# Patient Record
Sex: FEMALE | Race: WHITE | URBAN - NONMETROPOLITAN AREA
[De-identification: names, ages, dates, MRNs, and addresses within clinical notes are randomized per-mention and may not be internally consistent; named-entity substitution may affect disease eponyms.]

---

## 2021-10-07 ENCOUNTER — WEB ENCOUNTER (OUTPATIENT)
Dept: URBAN - NONMETROPOLITAN AREA CLINIC 13 | Facility: CLINIC | Age: 66
End: 2021-10-07

## 2021-10-07 ENCOUNTER — LAB OUTSIDE AN ENCOUNTER (OUTPATIENT)
Dept: URBAN - NONMETROPOLITAN AREA CLINIC 13 | Facility: CLINIC | Age: 66
End: 2021-10-07

## 2021-10-07 ENCOUNTER — OFFICE VISIT (OUTPATIENT)
Dept: URBAN - NONMETROPOLITAN AREA CLINIC 13 | Facility: CLINIC | Age: 66
End: 2021-10-07
Payer: MEDICARE

## 2021-10-07 DIAGNOSIS — R13.19 ESOPHAGEAL DYSPHAGIA: ICD-10-CM

## 2021-10-07 DIAGNOSIS — K62.5 RECTAL BLEEDING: ICD-10-CM

## 2021-10-07 DIAGNOSIS — K58.2 IRRITABLE BOWEL SYNDROME WITH BOTH CONSTIPATION AND DIARRHEA: ICD-10-CM

## 2021-10-07 DIAGNOSIS — R10.12 LEFT UPPER QUADRANT PAIN: ICD-10-CM

## 2021-10-07 DIAGNOSIS — Z12.11 COLON CANCER SCREENING: ICD-10-CM

## 2021-10-07 PROCEDURE — 99204 OFFICE O/P NEW MOD 45 MIN: CPT | Performed by: NURSE PRACTITIONER

## 2021-10-07 RX ORDER — POLYETHYLENE GLYCOL 3350, SODIUM SULFATE, SODIUM CHLORIDE, POTASSIUM CHLORIDE, ASCORBIC ACID, SODIUM ASCORBATE 140-9-5.2G
AS DIRECTED KIT ORAL ONCE
Qty: 1 KIT | Refills: 0 | OUTPATIENT
Start: 2021-10-07 | End: 2021-10-08

## 2021-10-07 RX ORDER — AMLODIPINE BESYLATE AND BENAZEPRIL HYDROCHLORIDE 5; 10 MG/1; MG/1
AS DIRECTED CAPSULE ORAL
Status: ACTIVE | COMMUNITY

## 2021-10-07 RX ORDER — DICYCLOMINE HYDROCHLORIDE 10 MG/1
1 CAPSULE BEFORE MEALS PRN ABDOMINAL PAIN CAPSULE ORAL THREE TIMES A DAY
Qty: 90 CAPSULE | Refills: 3 | OUTPATIENT
Start: 2021-10-07 | End: 2022-02-03

## 2021-10-07 RX ORDER — BUSPIRONE HYDROCHLORIDE 5 MG/1
1 TABLET TABLET ORAL TWICE A DAY
Status: ACTIVE | COMMUNITY

## 2021-10-07 RX ORDER — PANTOPRAZOLE SODIUM 40 MG/1
1 TABLET TABLET, DELAYED RELEASE ORAL ONCE A DAY
Qty: 90 TABLET | Refills: 3 | OUTPATIENT
Start: 2021-10-07

## 2021-10-07 NOTE — HPI-TODAY'S VISIT:
10/7/21 Megan Man is a 67 YO F who presents with complaints of rectal bleeding. She also has changes in her bowel habits with constipation and alternating urgency for bowel movementand recently has had abdominal pain, more so in the left upper quadrant, bloating, gas. She reports dysphagia for solids. Denies heartburn and indigestion.  Her last colonoscopy was 15 years ago with Dr. VILLASEÑOR.  Her bowel habits seem to be better on metamucil 2 capsules daily. She has been on bentyl in the past for IBS and is requesting refill today. TG

## 2021-10-11 ENCOUNTER — TELEPHONE ENCOUNTER (OUTPATIENT)
Dept: URBAN - NONMETROPOLITAN AREA CLINIC 2 | Facility: CLINIC | Age: 66
End: 2021-10-11

## 2021-11-10 ENCOUNTER — CLAIMS CREATED FROM THE CLAIM WINDOW (OUTPATIENT)
Dept: URBAN - METROPOLITAN AREA CLINIC 4 | Facility: CLINIC | Age: 66
End: 2021-11-10
Payer: MEDICARE

## 2021-11-10 ENCOUNTER — OFFICE VISIT (OUTPATIENT)
Dept: URBAN - NONMETROPOLITAN AREA SURGERY CENTER 1 | Facility: SURGERY CENTER | Age: 66
End: 2021-11-10
Payer: MEDICARE

## 2021-11-10 DIAGNOSIS — K31.89 DEFORMED PYLORUS, ACQUIRED: ICD-10-CM

## 2021-11-10 DIAGNOSIS — K63.89 BACTERIAL OVERGROWTH SYNDROME: ICD-10-CM

## 2021-11-10 DIAGNOSIS — K90.0 CELIAC DISEASE: ICD-10-CM

## 2021-11-10 DIAGNOSIS — D12.3 ADENOMA OF TRANSVERSE COLON: ICD-10-CM

## 2021-11-10 DIAGNOSIS — K31.89 ACQUIRED DEFORMITY OF DUODENUM: ICD-10-CM

## 2021-11-10 DIAGNOSIS — K63.89 POLYP, HYPERPLASTIC: ICD-10-CM

## 2021-11-10 DIAGNOSIS — K21.9 ACID REFLUX: ICD-10-CM

## 2021-11-10 DIAGNOSIS — D12.3 BENIGN NEOPLASM OF TRANSVERSE COLON: ICD-10-CM

## 2021-11-10 DIAGNOSIS — K92.1 HEMATOCHEZIA: ICD-10-CM

## 2021-11-10 DIAGNOSIS — K21.9 GASTRO-ESOPHAGEAL REFLUX DISEASE WITHOUT ESOPHAGITIS: ICD-10-CM

## 2021-11-10 DIAGNOSIS — R13.19 DYSPHAGIA: ICD-10-CM

## 2021-11-10 PROCEDURE — 88305 TISSUE EXAM BY PATHOLOGIST: CPT | Performed by: PATHOLOGY

## 2021-11-10 PROCEDURE — 43239 EGD BIOPSY SINGLE/MULTIPLE: CPT | Performed by: INTERNAL MEDICINE

## 2021-11-10 PROCEDURE — G8907 PT DOC NO EVENTS ON DISCHARG: HCPCS | Performed by: INTERNAL MEDICINE

## 2021-11-10 PROCEDURE — 88342 IMHCHEM/IMCYTCHM 1ST ANTB: CPT | Performed by: PATHOLOGY

## 2021-11-10 PROCEDURE — 45385 COLONOSCOPY W/LESION REMOVAL: CPT | Performed by: INTERNAL MEDICINE

## 2021-11-10 PROCEDURE — 45380 COLONOSCOPY AND BIOPSY: CPT | Performed by: INTERNAL MEDICINE

## 2021-11-10 PROCEDURE — 88312 SPECIAL STAINS GROUP 1: CPT | Performed by: PATHOLOGY

## 2021-11-10 RX ORDER — PANTOPRAZOLE SODIUM 40 MG/1
1 TABLET TABLET, DELAYED RELEASE ORAL ONCE A DAY
Qty: 90 TABLET | Refills: 3 | Status: ACTIVE | COMMUNITY
Start: 2021-10-07

## 2021-11-10 RX ORDER — AMLODIPINE BESYLATE AND BENAZEPRIL HYDROCHLORIDE 5; 10 MG/1; MG/1
AS DIRECTED CAPSULE ORAL
Status: ACTIVE | COMMUNITY

## 2021-11-10 RX ORDER — DICYCLOMINE HYDROCHLORIDE 10 MG/1
1 CAPSULE BEFORE MEALS PRN ABDOMINAL PAIN CAPSULE ORAL THREE TIMES A DAY
Qty: 90 CAPSULE | Refills: 3 | Status: ACTIVE | COMMUNITY
Start: 2021-10-07 | End: 2022-02-03

## 2021-11-10 RX ORDER — BUSPIRONE HYDROCHLORIDE 5 MG/1
1 TABLET TABLET ORAL TWICE A DAY
Status: ACTIVE | COMMUNITY

## 2021-11-24 ENCOUNTER — OFFICE VISIT (OUTPATIENT)
Dept: URBAN - NONMETROPOLITAN AREA CLINIC 2 | Facility: CLINIC | Age: 66
End: 2021-11-24

## 2021-12-02 ENCOUNTER — TELEPHONE ENCOUNTER (OUTPATIENT)
Dept: URBAN - NONMETROPOLITAN AREA CLINIC 2 | Facility: CLINIC | Age: 66
End: 2021-12-02

## 2021-12-08 ENCOUNTER — OFFICE VISIT (OUTPATIENT)
Dept: URBAN - NONMETROPOLITAN AREA SURGERY CENTER 1 | Facility: SURGERY CENTER | Age: 66
End: 2021-12-08

## 2021-12-08 ENCOUNTER — OFFICE VISIT (OUTPATIENT)
Dept: URBAN - NONMETROPOLITAN AREA CLINIC 13 | Facility: CLINIC | Age: 66
End: 2021-12-08
Payer: MEDICARE

## 2021-12-08 DIAGNOSIS — K90.0 CELIAC DISEASE: ICD-10-CM

## 2021-12-08 DIAGNOSIS — R10.12 LEFT UPPER QUADRANT PAIN: ICD-10-CM

## 2021-12-08 DIAGNOSIS — Z12.11 COLON CANCER SCREENING: ICD-10-CM

## 2021-12-08 DIAGNOSIS — K58.2 IRRITABLE BOWEL SYNDROME WITH BOTH CONSTIPATION AND DIARRHEA: ICD-10-CM

## 2021-12-08 DIAGNOSIS — R13.19 ESOPHAGEAL DYSPHAGIA: ICD-10-CM

## 2021-12-08 DIAGNOSIS — Z86.010 PERSONAL HISTORY OF COLONIC POLYPS: ICD-10-CM

## 2021-12-08 DIAGNOSIS — K21.00 GASTROESOPHAGEAL REFLUX DISEASE WITH ESOPHAGITIS WITHOUT HEMORRHAGE: ICD-10-CM

## 2021-12-08 PROCEDURE — 99214 OFFICE O/P EST MOD 30 MIN: CPT | Performed by: NURSE PRACTITIONER

## 2021-12-08 RX ORDER — DICYCLOMINE HYDROCHLORIDE 10 MG/1
1 CAPSULE BEFORE MEALS PRN ABDOMINAL PAIN CAPSULE ORAL THREE TIMES A DAY
Qty: 90 CAPSULE | Refills: 3 | Status: ACTIVE | COMMUNITY
Start: 2021-10-07 | End: 2022-02-03

## 2021-12-08 RX ORDER — AMLODIPINE BESYLATE AND BENAZEPRIL HYDROCHLORIDE 5; 10 MG/1; MG/1
AS DIRECTED CAPSULE ORAL
Status: ACTIVE | COMMUNITY

## 2021-12-08 RX ORDER — BUSPIRONE HYDROCHLORIDE 5 MG/1
1 TABLET TABLET ORAL TWICE A DAY
Status: ACTIVE | COMMUNITY

## 2021-12-08 RX ORDER — PANTOPRAZOLE SODIUM 40 MG/1
1 TABLET TABLET, DELAYED RELEASE ORAL ONCE A DAY
Qty: 90 TABLET | Refills: 3 | Status: ACTIVE | COMMUNITY
Start: 2021-10-07

## 2021-12-08 NOTE — HPI-TODAY'S VISIT:
10/7/21 Megan Man is a 65 YO F who presents with complaints of rectal bleeding. She also has changes in her bowel habits with constipation and alternating urgency for bowel movementand recently has had abdominal pain, more so in the left upper quadrant, bloating, gas. She reports dysphagia for solids. Denies heartburn and indigestion.  Her last colonoscopy was 15 years ago with Dr. VILLASEÑOR.  Her bowel habits seem to be better on metamucil 2 capsules daily. She has been on bentyl in the past for IBS and is requesting refill today. TG  12/8/2021 Ms Man presents for follow up after UGI, EGD, and colonoscopy.  UGI 11/5/21: tertiary contractions of esophagus r/t GERD and esophageal irritability, small medially directed 2nd portion of duodenum diverticulum.  EGD 11/10/2021 Dr. Lopez: normal exam. 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 Colonoscopy 11/10/2021; 5 polyps, largest measuring 15mm in the transverse colon removed piecemeal. Path c/w TA. Also noted to have left sided diverticulosis, and internal hemorrhoids. Repeat colonoscopy in 6 months with plenvu constipation prep per Dr. Lopez.

## 2021-12-16 ENCOUNTER — OFFICE VISIT (OUTPATIENT)
Dept: URBAN - NONMETROPOLITAN AREA CLINIC 13 | Facility: CLINIC | Age: 66
End: 2021-12-16

## 2021-12-20 LAB
A/G RATIO: 2.2
ADDITIONAL INFORMATION:: (no result)
ALBUMIN: 4.6
ALKALINE PHOSPHATASE: 83
ALT (SGPT): 26
AST (SGOT): 20
BILIRUBIN, TOTAL: 1.1
BUN/CREATININE RATIO: 29
BUN: 22
CALCIUM: 9.3
CARBON DIOXIDE, TOTAL: 24
CHLORIDE: 102
COMMENT:: (no result)
CREATININE: 0.76
DQ2 (DQA1 0501/0505,DQB1 02XX): POSITIVE
DQ8 (DQA1 03XX, DQB1 0302): NEGATIVE
EGFR IF AFRICN AM: 93
EGFR IF NONAFRICN AM: 81
ENDOMYSIAL ANTIBODY IGA: NEGATIVE
GLOBULIN, TOTAL: 2.1
GLUCOSE: 123
HEMATOCRIT: 42.8
HEMOGLOBIN: 14.5
IMMUNOGLOBULIN A, QN, SERUM: 185
MCH: 30
MCHC: 33.9
MCV: 89
NRBC: (no result)
PLATELETS: 263
POTASSIUM: 4.4
PROTEIN, TOTAL: 6.7
RBC: 4.83
RDW: 13.1
SODIUM: 139
T-TRANSGLUTAMINASE (TTG) IGA: <2
WBC: 6

## 2021-12-22 ENCOUNTER — TELEPHONE ENCOUNTER (OUTPATIENT)
Dept: URBAN - NONMETROPOLITAN AREA CLINIC 2 | Facility: CLINIC | Age: 66
End: 2021-12-22

## 2022-01-05 ENCOUNTER — OFFICE VISIT (OUTPATIENT)
Dept: URBAN - NONMETROPOLITAN AREA CLINIC 13 | Facility: CLINIC | Age: 67
End: 2022-01-05

## 2022-03-09 ENCOUNTER — OFFICE VISIT (OUTPATIENT)
Dept: URBAN - NONMETROPOLITAN AREA CLINIC 13 | Facility: CLINIC | Age: 67
End: 2022-03-09
Payer: MEDICARE

## 2022-03-09 ENCOUNTER — LAB OUTSIDE AN ENCOUNTER (OUTPATIENT)
Dept: URBAN - NONMETROPOLITAN AREA CLINIC 13 | Facility: CLINIC | Age: 67
End: 2022-03-09

## 2022-03-09 ENCOUNTER — DASHBOARD ENCOUNTERS (OUTPATIENT)
Age: 67
End: 2022-03-09

## 2022-03-09 DIAGNOSIS — R10.12 LEFT UPPER QUADRANT PAIN: ICD-10-CM

## 2022-03-09 DIAGNOSIS — K21.00 GASTROESOPHAGEAL REFLUX DISEASE WITH ESOPHAGITIS WITHOUT HEMORRHAGE: ICD-10-CM

## 2022-03-09 DIAGNOSIS — K90.0 CELIAC DISEASE: ICD-10-CM

## 2022-03-09 DIAGNOSIS — R13.19 ESOPHAGEAL DYSPHAGIA: ICD-10-CM

## 2022-03-09 DIAGNOSIS — R19.8 ALTERNATING CONSTIPATION AND DIARRHEA: ICD-10-CM

## 2022-03-09 DIAGNOSIS — Z86.010 PERSONAL HISTORY OF COLONIC POLYPS: ICD-10-CM

## 2022-03-09 PROBLEM — 10743008: Status: ACTIVE | Noted: 2021-10-07

## 2022-03-09 PROBLEM — 396331005: Status: ACTIVE | Noted: 2021-12-08

## 2022-03-09 PROBLEM — 266433003: Status: ACTIVE | Noted: 2021-12-08

## 2022-03-09 PROBLEM — 301715003: Status: ACTIVE | Noted: 2021-10-08

## 2022-03-09 PROCEDURE — 99214 OFFICE O/P EST MOD 30 MIN: CPT | Performed by: NURSE PRACTITIONER

## 2022-03-09 RX ORDER — PANTOPRAZOLE SODIUM 40 MG/1
1 TABLET TABLET, DELAYED RELEASE ORAL ONCE A DAY
Qty: 90 TABLET | Refills: 3 | Status: ACTIVE | COMMUNITY
Start: 2021-10-07

## 2022-03-09 RX ORDER — POLYETHYLENE GLYCOL 3350, SODIUM SULFATE, SODIUM CHLORIDE, POTASSIUM CHLORIDE, ASCORBIC ACID, SODIUM ASCORBATE 140-9-5.2G
AS DIRECTED KIT ORAL ONCE
Qty: 1 KIT | Refills: 0 | OUTPATIENT
Start: 2022-03-09 | End: 2022-03-10

## 2022-03-09 RX ORDER — AMLODIPINE BESYLATE AND BENAZEPRIL HYDROCHLORIDE 5; 10 MG/1; MG/1
AS DIRECTED CAPSULE ORAL
Status: ACTIVE | COMMUNITY

## 2022-03-09 RX ORDER — BUSPIRONE HYDROCHLORIDE 5 MG/1
1 TABLET TABLET ORAL TWICE A DAY
Status: ACTIVE | COMMUNITY

## 2022-03-09 NOTE — HPI-TODAY'S VISIT:
10/7/21 Megan Man is a 65 YO F who presents with complaints of rectal bleeding. She also has changes in her bowel habits with constipation and alternating urgency for bowel movementand recently has had abdominal pain, more so in the left upper quadrant, bloating, gas. She reports dysphagia for solids. Denies heartburn and indigestion.  Her last colonoscopy was 15 years ago with Dr. VILLASEÑOR.  Her bowel habits seem to be better on metamucil 2 capsules daily. She has been on bentyl in the past for IBS and is requesting refill today. TG  12/8/2021 Ms Man presents for follow up after UGI, EGD, and colonoscopy.  UGI 11/5/21: tertiary contractions of esophagus r/t GERD and esophageal irritability, small medially directed 2nd portion of duodenum diverticulum.  EGD 11/10/2021 Dr. Lopez: normal exam. 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 Colonoscopy 11/10/2021; 5 polyps, largest measuring 15mm in the transverse colon removed piecemeal. Path c/w TA. Also noted to have left sided diverticulosis, and internal hemorrhoids. Repeat colonoscopy in 6 months with plenvu constipation prep per Dr. Lopez.  3/9/22 Ms Man presents for follow up for for above GI symptoms, post serologic workup for celiac, and personal history of colon polyps.  Her celiac titers were negative. 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 She has been watching and limiting her gluten intake but has not gone on a gluten free diet. Her constipation, abdominal pain, bloating have resolved with her dietary changes. Discussed going on true GF diet and repeating EGD with [facility] to see if above changes resolved however she does not want to pursue any further workup at this time. She is happy with the improvement with limiting gluten and does not want to go on strict GF diet.  As above, she had 5 polyps on colonoscopy 11/2021, largest measurig 15mm and is due for repeat in May with plans to complete plenvu prep. Will get that scheduled today. TG

## 2022-05-13 PROBLEM — 428283002: Status: ACTIVE | Noted: 2021-12-08

## 2022-06-02 ENCOUNTER — OFFICE VISIT (OUTPATIENT)
Dept: URBAN - NONMETROPOLITAN AREA SURGERY CENTER 1 | Facility: SURGERY CENTER | Age: 67
End: 2022-06-02
Payer: MEDICARE

## 2022-06-02 ENCOUNTER — CLAIMS CREATED FROM THE CLAIM WINDOW (OUTPATIENT)
Dept: URBAN - METROPOLITAN AREA CLINIC 4 | Facility: CLINIC | Age: 67
End: 2022-06-02
Payer: MEDICARE

## 2022-06-02 DIAGNOSIS — K63.89 CYST OF DUODENUM: ICD-10-CM

## 2022-06-02 DIAGNOSIS — D12.2 ADENOMA OF ASCENDING COLON: ICD-10-CM

## 2022-06-02 DIAGNOSIS — K63.89 BACTERIAL OVERGROWTH SYNDROME: ICD-10-CM

## 2022-06-02 DIAGNOSIS — D12.2 BENIGN NEOPLASM OF ASCENDING COLON: ICD-10-CM

## 2022-06-02 DIAGNOSIS — Z86.010 ADENOMAS PERSONAL HISTORY OF COLONIC POLYPS: ICD-10-CM

## 2022-06-02 DIAGNOSIS — D12.4 ADENOMA OF DESCENDING COLON: ICD-10-CM

## 2022-06-02 DIAGNOSIS — D12.4 BENIGN NEOPLASM OF DESCENDING COLON: ICD-10-CM

## 2022-06-02 PROCEDURE — 88305 TISSUE EXAM BY PATHOLOGIST: CPT | Performed by: PATHOLOGY

## 2022-06-02 PROCEDURE — 45385 COLONOSCOPY W/LESION REMOVAL: CPT | Performed by: INTERNAL MEDICINE

## 2022-06-02 PROCEDURE — G8907 PT DOC NO EVENTS ON DISCHARG: HCPCS | Performed by: INTERNAL MEDICINE

## 2022-06-02 RX ORDER — BUSPIRONE HYDROCHLORIDE 5 MG/1
1 TABLET TABLET ORAL TWICE A DAY
Status: ACTIVE | COMMUNITY

## 2022-06-02 RX ORDER — PANTOPRAZOLE SODIUM 40 MG/1
1 TABLET TABLET, DELAYED RELEASE ORAL ONCE A DAY
Qty: 90 TABLET | Refills: 3 | Status: ACTIVE | COMMUNITY
Start: 2021-10-07

## 2022-06-02 RX ORDER — AMLODIPINE BESYLATE AND BENAZEPRIL HYDROCHLORIDE 5; 10 MG/1; MG/1
AS DIRECTED CAPSULE ORAL
Status: ACTIVE | COMMUNITY

## 2022-12-01 ENCOUNTER — OFFICE VISIT (OUTPATIENT)
Dept: URBAN - NONMETROPOLITAN AREA CLINIC 2 | Facility: CLINIC | Age: 67
End: 2022-12-01

## 2022-12-02 ENCOUNTER — OFFICE VISIT (OUTPATIENT)
Dept: URBAN - NONMETROPOLITAN AREA CLINIC 13 | Facility: CLINIC | Age: 67
End: 2022-12-02

## 2025-02-19 ENCOUNTER — LAB OUTSIDE AN ENCOUNTER (OUTPATIENT)
Dept: URBAN - NONMETROPOLITAN AREA CLINIC 2 | Facility: CLINIC | Age: 70
End: 2025-02-19

## 2025-02-19 ENCOUNTER — OFFICE VISIT (OUTPATIENT)
Dept: URBAN - NONMETROPOLITAN AREA CLINIC 2 | Facility: CLINIC | Age: 70
End: 2025-02-19

## 2025-02-19 VITALS
WEIGHT: 186 LBS | DIASTOLIC BLOOD PRESSURE: 90 MMHG | SYSTOLIC BLOOD PRESSURE: 151 MMHG | HEART RATE: 64 BPM | BODY MASS INDEX: 31.76 KG/M2 | HEIGHT: 64 IN

## 2025-02-19 RX ORDER — PANTOPRAZOLE SODIUM 40 MG/1
1 TABLET TABLET, DELAYED RELEASE ORAL ONCE A DAY
Qty: 90 TABLET | Refills: 3 | Status: DISCONTINUED | COMMUNITY
Start: 2021-10-07

## 2025-02-19 RX ORDER — BUSPIRONE HYDROCHLORIDE 5 MG/1
1 TABLET TABLET ORAL TWICE A DAY
Status: DISCONTINUED | COMMUNITY

## 2025-02-19 RX ORDER — AMLODIPINE BESYLATE AND BENAZEPRIL HYDROCHLORIDE 5; 10 MG/1; MG/1
AS DIRECTED CAPSULE ORAL
Status: ACTIVE | COMMUNITY

## 2025-02-20 LAB
ABSOLUTE BASOPHILS: 9
ABSOLUTE EOSINOPHILS: 511
ABSOLUTE LYMPHOCYTES: 1877
ABSOLUTE MONOCYTES: 419
ABSOLUTE NEUTROPHILS: 1785
ALBUMIN/GLOBULIN RATIO: 2
ALBUMIN: 4.7
ALKALINE PHOSPHATASE: 90
ALT: 20
AST: 18
BASOPHILS: 0.2
BILIRUBIN, TOTAL: 1.1
BUN/CREATININE RATIO: (no result)
CALCIUM: 9.9
CARBON DIOXIDE: 28
CHLORIDE: 102
CREATININE: 0.71
EGFR: 91
EOSINOPHILS: 11.1
FIB 4 INDEX: 1.05
FIB 4 INTERPRETATION: (no result)
GLOBULIN: 2.3
GLUCOSE: 111
HEMATOCRIT: 43.9
HEMOGLOBIN: 14.8
IMMUNOGLOBULIN A: 183
INTERPRETATION: (no result)
LYMPHOCYTES: 40.8
MCH: 30.3
MCHC: 33.7
MCV: 90
MONOCYTES: 9.1
MPV: 10.3
NEUTROPHILS: 38.8
PLATELET COUNT: 273
PLATELET COUNT: 273
POTASSIUM: 4.6
PROTEIN, TOTAL: 7
RDW: 12.8
RED BLOOD CELL COUNT: 4.88
SODIUM: 140
TISSUE TRANSGLUTAMINASE AB, IGA: <1
TSH W/REFLEX TO FT4: 0.71
UREA NITROGEN (BUN): 18
WHITE BLOOD CELL COUNT: 4.6

## 2025-02-21 ENCOUNTER — OFFICE VISIT (OUTPATIENT)
Dept: URBAN - NONMETROPOLITAN AREA CLINIC 2 | Facility: CLINIC | Age: 70
End: 2025-02-21

## 2025-02-26 ENCOUNTER — TELEPHONE ENCOUNTER (OUTPATIENT)
Dept: URBAN - NONMETROPOLITAN AREA CLINIC 2 | Facility: CLINIC | Age: 70
End: 2025-02-26

## 2025-03-06 ENCOUNTER — TELEPHONE ENCOUNTER (OUTPATIENT)
Dept: URBAN - METROPOLITAN AREA CLINIC 63 | Facility: CLINIC | Age: 70
End: 2025-03-06

## 2025-03-06 PROBLEM — 40890009: Status: ACTIVE | Noted: 2025-03-06

## 2025-03-06 PROBLEM — 116289008: Status: ACTIVE | Noted: 2025-03-06

## 2025-03-06 PROBLEM — 249517009: Status: ACTIVE | Noted: 2025-03-06

## 2025-03-06 PROBLEM — 54586004: Status: ACTIVE | Noted: 2025-03-06

## 2025-03-11 ENCOUNTER — TELEPHONE ENCOUNTER (OUTPATIENT)
Dept: URBAN - NONMETROPOLITAN AREA CLINIC 2 | Facility: CLINIC | Age: 70
End: 2025-03-11

## 2025-03-11 RX ORDER — SODIUM, POTASSIUM,MAG SULFATES 17.5-3.13G
177ML SOLUTION, RECONSTITUTED, ORAL ORAL
Qty: 1 | Refills: 0 | OUTPATIENT
Start: 2025-03-11 | End: 2025-03-13

## 2025-03-26 ENCOUNTER — OFFICE VISIT (OUTPATIENT)
Dept: URBAN - NONMETROPOLITAN AREA CLINIC 2 | Facility: CLINIC | Age: 70
End: 2025-03-26

## 2025-03-28 ENCOUNTER — TELEPHONE ENCOUNTER (OUTPATIENT)
Dept: URBAN - NONMETROPOLITAN AREA CLINIC 2 | Facility: CLINIC | Age: 70
End: 2025-03-28

## 2025-03-28 ENCOUNTER — OFFICE VISIT (OUTPATIENT)
Dept: URBAN - NONMETROPOLITAN AREA SURGERY CENTER 1 | Facility: SURGERY CENTER | Age: 70
End: 2025-03-28

## 2025-03-28 PROBLEM — 1086791000119100: Status: ACTIVE | Noted: 2025-03-28

## 2025-03-28 RX ORDER — AMLODIPINE BESYLATE AND BENAZEPRIL HYDROCHLORIDE 5; 10 MG/1; MG/1
AS DIRECTED CAPSULE ORAL
Status: ACTIVE | COMMUNITY

## 2025-03-28 RX ORDER — OMEPRAZOLE 40 MG/1
1 CAPSULE 30 MINUTES BEFORE MEAL CAPSULE, DELAYED RELEASE ORAL TWICE DAILY
Qty: 120 | Refills: 3 | OUTPATIENT
Start: 2025-03-28

## 2025-03-28 NOTE — HPI-TODAY'S VISIT:
Patient has active erosive gastritis noted on EGD.  Will send in omeprazole 40 mg twice daily x 8 weeks to treat.  She also requests to change over to our care team given that I did her procedures so will facilitate this as well.

## 2025-05-08 ENCOUNTER — OFFICE VISIT (OUTPATIENT)
Dept: URBAN - NONMETROPOLITAN AREA SURGERY CENTER 1 | Facility: SURGERY CENTER | Age: 70
End: 2025-05-08

## 2025-05-12 ENCOUNTER — OFFICE VISIT (OUTPATIENT)
Age: 70
End: 2025-05-12
Payer: COMMERCIAL

## 2025-05-12 DIAGNOSIS — K90.0 CELIAC DISEASE: ICD-10-CM

## 2025-05-12 DIAGNOSIS — R10.13 EPIGASTRIC PAIN: ICD-10-CM

## 2025-05-12 DIAGNOSIS — Z86.0101 PERSONAL HISTORY OF ADENOMATOUS AND SERRATED COLON POLYPS: ICD-10-CM

## 2025-05-12 PROCEDURE — 99214 OFFICE O/P EST MOD 30 MIN: CPT | Performed by: NURSE PRACTITIONER

## 2025-05-12 RX ORDER — OMEPRAZOLE 40 MG/1
1 CAPSULE 30 MINUTES BEFORE MEAL CAPSULE, DELAYED RELEASE ORAL TWICE DAILY
Qty: 120 | Refills: 3 | Status: ACTIVE | COMMUNITY
Start: 2025-03-28

## 2025-05-12 RX ORDER — AMLODIPINE BESYLATE AND BENAZEPRIL HYDROCHLORIDE 5; 10 MG/1; MG/1
AS DIRECTED CAPSULE ORAL
Status: ACTIVE | COMMUNITY

## 2025-05-12 RX ORDER — SUCRALFATE 1 G/1
1 TABLET ON AN EMPTY STOMACH TABLET ORAL TWICE A DAY
Qty: 180 TABLET | Refills: 3 | OUTPATIENT
Start: 2025-05-12

## 2025-05-12 RX ORDER — HYOSCYAMINE SULFATE 0.125 MG
1 TABLET ON THE TONGUE AND ALLOW TO DISSOLVE  AS NEEDED FOR ABDOMINAL PAIN TABLET,DISINTEGRATING ORAL
Qty: 30 LOZENGE | Refills: 6 | OUTPATIENT
Start: 2025-05-12

## 2025-05-12 NOTE — HPI-TODAY'S VISIT:
Patient has active erosive gastritis noted on EGD.  Will send in omeprazole 40 mg twice daily x 8 weeks to treat.  She also requests to change over to our care team given that I did her procedures so will facilitate this as well. 5/12/25. Patient is a pleasant 70-year-old female who presents with a chief complaint of abdominal pain. Tends to be more epigastric, but slightly to the left. Can be burning or sharp. Took lomotil with relief of pain, but  cause constipation. Reports constipation with dicyclomine as well, but it was also helpful. She has had an upper endoscopy with the roasted gastritis, colonoscopy, and negative CT imaging for this. Sb

## 2025-05-12 NOTE — HPI-TODAY'S VISIT:
10/7/21 Megan Man is a 67 YO F who presents with complaints of rectal bleeding. She also has changes in her bowel habits with constipation and alternating urgency for bowel movementand recently has had abdominal pain, more so in the left upper quadrant, bloating, gas. She reports dysphagia for solids. Denies heartburn and indigestion.  Her last colonoscopy was 15 years ago with Dr. VILLASEÑOR.  Her bowel habits seem to be better on metamucil 2 capsules daily. She has been on bentyl in the past for IBS and is requesting refill today. TG  12/8/2021 Ms Man presents for follow up after UGI, EGD, and colonoscopy.  UGI 11/5/21: tertiary contractions of esophagus r/t GERD and esophageal irritability, small medially directed 2nd portion of duodenum diverticulum.  EGD 11/10/2021 Dr. Lopez: normal exam. 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 Colonoscopy 11/10/2021; 5 polyps, largest measuring 15mm in the transverse colon removed piecemeal. Path c/w TA. Also noted to have left sided diverticulosis, and internal hemorrhoids. Repeat colonoscopy in 6 months with plenvu constipation prep per Dr. Lopez.  3/9/22 Ms Man presents for follow up for for above GI symptoms, post serologic workup for celiac, and personal history of colon polyps.  Her celiac titers were negative. 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 She has been watching and limiting her gluten intake but has not gone on a gluten free diet. Her constipation, abdominal pain, bloating have resolved with her dietary changes. Discussed going on true GF diet and repeating EGD with [facility] to see if above changes resolved however she does not want to pursue any further workup at this time. She is happy with the improvement with limiting gluten and does not want to go on strict GF diet.  As above, she had 5 polyps on colonoscopy 11/2021, largest measurig 15mm and is due for repeat in May with plans to complete plenvu prep. Will get that scheduled today. TG  2/19/2025 Patient returns for follow up. She feels a return of her GI symptoms. Onset 3 m ago of lower abdominal pressure and pain. She also endorses abdominal bloating  has returned. She has alternation in bowel habits and notes some blood tinged mucus She has only tried motrin no other medications. Her  weight is stable. She deniesdysphagia, dyspepsia, pyrosis,  unintentional weight loss, melena, or hematochezia.  SP

## 2025-05-15 LAB
(TTG) AB, IGA: <1
(TTG) AB, IGG: <1
A/G RATIO: 2
ABSOLUTE BASOPHILS: 10
ABSOLUTE EOSINOPHILS: 597
ABSOLUTE LYMPHOCYTES: 1693
ABSOLUTE MONOCYTES: 490
ABSOLUTE NEUTROPHILS: 2310
ALBUMIN: 4.5
ALKALINE PHOSPHATASE: 69
ALT (SGPT): 16
ANTIGLIADIN ABS, IGA: 24.1
AST (SGOT): 14
BASOPHILS: 0.2
BILIRUBIN, TOTAL: 1
BUN/CREATININE RATIO: 33
BUN: 27
CALCIUM: 9.2
CARBON DIOXIDE, TOTAL: 26
CHLORIDE: 101
CREATININE: 0.83
EGFR: 75
EOSINOPHILS: 11.7
GLIADIN (DEAMIDATED) AB (IGG): 35.2
GLOBULIN, TOTAL: 2.2
GLUCOSE: 109
HEMATOCRIT: 43.1
HEMOGLOBIN: 14.4
IMMUNOGLOBULIN A: 173
IMMUNOGLOBULIN A: 173
INTERPRETATION: (no result)
IRON BIND.CAP.(TIBC): 298
IRON SATURATION: 36
IRON: 107
LIPASE: 20
LYMPHOCYTES: 33.2
MCH: 30.3
MCHC: 33.4
MCV: 90.5
MONOCYTES: 9.6
MPV: 10.3
NEUTROPHILS: 45.3
PLATELET COUNT: 238
POTASSIUM: 4.6
PROTEIN, TOTAL: 6.7
RDW: 13.4
RED BLOOD CELL COUNT: 4.76
SODIUM: 138
TISSUE TRANSGLUTAMINASE AB, IGA: <1
WHITE BLOOD CELL COUNT: 5.1

## 2025-05-19 ENCOUNTER — TELEPHONE ENCOUNTER (OUTPATIENT)
Dept: URBAN - NONMETROPOLITAN AREA CLINIC 2 | Facility: CLINIC | Age: 70
End: 2025-05-19

## 2025-06-10 ENCOUNTER — OFFICE VISIT (OUTPATIENT)
Dept: URBAN - NONMETROPOLITAN AREA CLINIC 2 | Facility: CLINIC | Age: 70
End: 2025-06-10

## 2025-07-24 ENCOUNTER — OFFICE VISIT (OUTPATIENT)
Age: 70
End: 2025-07-24